# Patient Record
(demographics unavailable — no encounter records)

---

## 2025-02-19 NOTE — HISTORY OF PRESENT ILLNESS
[FreeTextEntry1] : referred by GYN Oncology for consultation patient to undergo JONATHON/BSO for ovarian cyst in face of positive marker she ahd prior h/o colon resection  no prior ureteral or bladder injury no issues with stones or UTI; normal voiding habits.   5/22/24 - Pt returns for evaluation of incontinence since she had her JONATHON 2 years ago. She informs that she losses her urine daily if she allows her bladder to become too full. It does not occur when bladder is not full. Does not lose urine when coughing or sneezing just changing positions. Urinates x5 daytime, nocturia is 0-1x.some degree of urgency and occasional urge incontinence.  Hx. of colon cancer at age 44 -10 inches of colon resected.  Here also because she always has microscopic hematuria  and protein in her urine and has not been here in 2 years.  2/25 refwerred back as told has MH again. notes had this off/on since childhood. Never Gross. has had a cystoscopy in the past  vodis ok with sit DORY as sits on toilet

## 2025-02-19 NOTE — ASSESSMENT
[FreeTextEntry1] : noted her last UA had +trace blood but microscopy negative. did review MH in detail along with the new AUA guidelines and low likelihood for cancer in her case will repeat UA